# Patient Record
Sex: MALE | Race: WHITE | NOT HISPANIC OR LATINO | Employment: UNEMPLOYED | URBAN - METROPOLITAN AREA
[De-identification: names, ages, dates, MRNs, and addresses within clinical notes are randomized per-mention and may not be internally consistent; named-entity substitution may affect disease eponyms.]

---

## 2019-10-27 ENCOUNTER — HOSPITAL ENCOUNTER (EMERGENCY)
Facility: OTHER | Age: 29
Discharge: HOME | End: 2019-10-27
Attending: EMERGENCY MEDICINE

## 2019-10-27 VITALS
WEIGHT: 180 LBS | HEART RATE: 116 BPM | HEIGHT: 72 IN | BODY MASS INDEX: 24.38 KG/M2 | TEMPERATURE: 98 F | SYSTOLIC BLOOD PRESSURE: 125 MMHG | DIASTOLIC BLOOD PRESSURE: 99 MMHG | RESPIRATION RATE: 17 BRPM | OXYGEN SATURATION: 98 %

## 2019-10-27 DIAGNOSIS — S02.2XXA CLOSED FRACTURE OF NASAL BONE, INITIAL ENCOUNTER: ICD-10-CM

## 2019-10-27 DIAGNOSIS — R04.0 EPISTAXIS: ICD-10-CM
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
## 2019-10-27 PROCEDURE — 25000003 PHARM REV CODE 250: Performed by: EMERGENCY MEDICINE

## 2019-10-27 PROCEDURE — 99283 EMERGENCY DEPT VISIT LOW MDM: CPT

## 2019-10-27 RX ORDER — HYDROCODONE BITARTRATE AND ACETAMINOPHEN 5; 325 MG/1; MG/1
1 TABLET ORAL EVERY 6 HOURS PRN
Qty: 12 TABLET | Refills: 0 | Status: SHIPPED | OUTPATIENT
Start: 2019-10-27 | End: 2019-10-30

## 2019-10-27 RX ORDER — OXYCODONE AND ACETAMINOPHEN 7.5; 325 MG/1; MG/1
1 TABLET ORAL EVERY 4 HOURS PRN
Status: DISCONTINUED | OUTPATIENT
Start: 2019-10-27 | End: 2019-10-27 | Stop reason: HOSPADM

## 2019-10-27 RX ADMIN — OXYCODONE HYDROCHLORIDE AND ACETAMINOPHEN 1 TABLET: 7.5; 325 TABLET ORAL at 05:10
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
## 2019-10-27 NOTE — ED NOTES
Pt cleaned of dried blood to hands. Pt requesting 30mg of percocet. Scant clotted blood noted from nose.
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
## 2021-07-31 ENCOUNTER — EMERGENCY (EMERGENCY)
Facility: HOSPITAL | Age: 31
LOS: 0 days | Discharge: HOME | End: 2021-07-31
Attending: EMERGENCY MEDICINE | Admitting: EMERGENCY MEDICINE
Payer: COMMERCIAL

## 2021-07-31 VITALS
OXYGEN SATURATION: 99 % | RESPIRATION RATE: 18 BRPM | DIASTOLIC BLOOD PRESSURE: 93 MMHG | SYSTOLIC BLOOD PRESSURE: 147 MMHG | TEMPERATURE: 99 F | WEIGHT: 179.9 LBS | HEART RATE: 95 BPM

## 2021-07-31 DIAGNOSIS — R42 DIZZINESS AND GIDDINESS: ICD-10-CM

## 2021-07-31 DIAGNOSIS — R00.2 PALPITATIONS: ICD-10-CM

## 2021-07-31 DIAGNOSIS — R41.9 UNSPECIFIED SYMPTOMS AND SIGNS INVOLVING COGNITIVE FUNCTIONS AND AWARENESS: ICD-10-CM

## 2021-07-31 DIAGNOSIS — K21.9 GASTRO-ESOPHAGEAL REFLUX DISEASE WITHOUT ESOPHAGITIS: ICD-10-CM

## 2021-07-31 DIAGNOSIS — F41.9 ANXIETY DISORDER, UNSPECIFIED: ICD-10-CM

## 2021-07-31 LAB
ALBUMIN SERPL ELPH-MCNC: 5.1 G/DL — SIGNIFICANT CHANGE UP (ref 3.5–5.2)
ALP SERPL-CCNC: 69 U/L — SIGNIFICANT CHANGE UP (ref 30–115)
ALT FLD-CCNC: 45 U/L — HIGH (ref 0–41)
ANION GAP SERPL CALC-SCNC: 14 MMOL/L — SIGNIFICANT CHANGE UP (ref 7–14)
AST SERPL-CCNC: 32 U/L — SIGNIFICANT CHANGE UP (ref 0–41)
BASOPHILS # BLD AUTO: 0.08 K/UL — SIGNIFICANT CHANGE UP (ref 0–0.2)
BASOPHILS NFR BLD AUTO: 0.8 % — SIGNIFICANT CHANGE UP (ref 0–1)
BILIRUB SERPL-MCNC: 0.4 MG/DL — SIGNIFICANT CHANGE UP (ref 0.2–1.2)
BUN SERPL-MCNC: 11 MG/DL — SIGNIFICANT CHANGE UP (ref 10–20)
CALCIUM SERPL-MCNC: 10 MG/DL — SIGNIFICANT CHANGE UP (ref 8.5–10.1)
CHLORIDE SERPL-SCNC: 102 MMOL/L — SIGNIFICANT CHANGE UP (ref 98–110)
CO2 SERPL-SCNC: 21 MMOL/L — SIGNIFICANT CHANGE UP (ref 17–32)
CREAT SERPL-MCNC: 0.8 MG/DL — SIGNIFICANT CHANGE UP (ref 0.7–1.5)
EOSINOPHIL # BLD AUTO: 0.12 K/UL — SIGNIFICANT CHANGE UP (ref 0–0.7)
EOSINOPHIL NFR BLD AUTO: 1.3 % — SIGNIFICANT CHANGE UP (ref 0–8)
GLUCOSE SERPL-MCNC: 82 MG/DL — SIGNIFICANT CHANGE UP (ref 70–99)
HCT VFR BLD CALC: 45.4 % — SIGNIFICANT CHANGE UP (ref 42–52)
HGB BLD-MCNC: 15.8 G/DL — SIGNIFICANT CHANGE UP (ref 14–18)
IMM GRANULOCYTES NFR BLD AUTO: 0.3 % — SIGNIFICANT CHANGE UP (ref 0.1–0.3)
LYMPHOCYTES # BLD AUTO: 2.4 K/UL — SIGNIFICANT CHANGE UP (ref 1.2–3.4)
LYMPHOCYTES # BLD AUTO: 25.1 % — SIGNIFICANT CHANGE UP (ref 20.5–51.1)
MAGNESIUM SERPL-MCNC: 1.9 MG/DL — SIGNIFICANT CHANGE UP (ref 1.8–2.4)
MCHC RBC-ENTMCNC: 30.9 PG — SIGNIFICANT CHANGE UP (ref 27–31)
MCHC RBC-ENTMCNC: 34.8 G/DL — SIGNIFICANT CHANGE UP (ref 32–37)
MCV RBC AUTO: 88.7 FL — SIGNIFICANT CHANGE UP (ref 80–94)
MONOCYTES # BLD AUTO: 0.93 K/UL — HIGH (ref 0.1–0.6)
MONOCYTES NFR BLD AUTO: 9.7 % — HIGH (ref 1.7–9.3)
NEUTROPHILS # BLD AUTO: 6 K/UL — SIGNIFICANT CHANGE UP (ref 1.4–6.5)
NEUTROPHILS NFR BLD AUTO: 62.8 % — SIGNIFICANT CHANGE UP (ref 42.2–75.2)
NRBC # BLD: 0 /100 WBCS — SIGNIFICANT CHANGE UP (ref 0–0)
PLATELET # BLD AUTO: 224 K/UL — SIGNIFICANT CHANGE UP (ref 130–400)
POTASSIUM SERPL-MCNC: 3.9 MMOL/L — SIGNIFICANT CHANGE UP (ref 3.5–5)
POTASSIUM SERPL-SCNC: 3.9 MMOL/L — SIGNIFICANT CHANGE UP (ref 3.5–5)
PROT SERPL-MCNC: 8.1 G/DL — HIGH (ref 6–8)
RBC # BLD: 5.12 M/UL — SIGNIFICANT CHANGE UP (ref 4.7–6.1)
RBC # FLD: 12 % — SIGNIFICANT CHANGE UP (ref 11.5–14.5)
SODIUM SERPL-SCNC: 137 MMOL/L — SIGNIFICANT CHANGE UP (ref 135–146)
WBC # BLD: 9.56 K/UL — SIGNIFICANT CHANGE UP (ref 4.8–10.8)
WBC # FLD AUTO: 9.56 K/UL — SIGNIFICANT CHANGE UP (ref 4.8–10.8)

## 2021-07-31 PROCEDURE — 99284 EMERGENCY DEPT VISIT MOD MDM: CPT

## 2021-07-31 PROCEDURE — 93010 ELECTROCARDIOGRAM REPORT: CPT

## 2021-07-31 RX ORDER — FAMOTIDINE 10 MG/ML
20 INJECTION INTRAVENOUS ONCE
Refills: 0 | Status: COMPLETED | OUTPATIENT
Start: 2021-07-31 | End: 2021-07-31

## 2021-07-31 RX ORDER — SODIUM CHLORIDE 9 MG/ML
1000 INJECTION INTRAMUSCULAR; INTRAVENOUS; SUBCUTANEOUS ONCE
Refills: 0 | Status: COMPLETED | OUTPATIENT
Start: 2021-07-31 | End: 2021-07-31

## 2021-07-31 RX ADMIN — FAMOTIDINE 100 MILLIGRAM(S): 10 INJECTION INTRAVENOUS at 21:05

## 2021-07-31 RX ADMIN — SODIUM CHLORIDE 1000 MILLILITER(S): 9 INJECTION INTRAMUSCULAR; INTRAVENOUS; SUBCUTANEOUS at 21:05

## 2021-07-31 NOTE — ED PROVIDER NOTE - CLINICAL SUMMARY MEDICAL DECISION MAKING FREE TEXT BOX
Labs unremarkable.  EKG NSR no acute changes.  Given IVF and Pepcid with improvement.  Will D/C to follow up with PCP.

## 2021-07-31 NOTE — ED PROVIDER NOTE - OBJECTIVE STATEMENT
30 yo male hx of anxiety and GERD present c/o "feeling anxious and fluttering to his chest and LUQ and lightheaded" all day after drinking heavily a night ago. denies nausea and vomiting. continue with fluttering sensation to his chest to he came to ED for evaluation. Denies drug use last night.  Denies fever/chill/HA/dizziness/sob/abd pain/n/v/d/ black stool/bloody stool/urinary sxs
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
## 2021-07-31 NOTE — ED ADULT NURSE NOTE - NSFALLRSKOUTCOME_ED_ALL_ED
Addended by: MARISOL CARABALLO on: 5/12/2017 11:58 AM     Modules accepted: Lillian Epstein    
Universal Safety Interventions

## 2021-07-31 NOTE — ED PROVIDER NOTE - PATIENT PORTAL LINK FT
You can access the FollowMyHealth Patient Portal offered by [facility] by registering at the following website: [email or web address]. By joining [facility]’s FollowMyHealth portal, you will also be able to view your health information using other applications (apps) compatible with our system.

## 2021-08-29 ENCOUNTER — EMERGENCY (EMERGENCY)
Facility: HOSPITAL | Age: 31
LOS: 0 days | Discharge: HOME | End: 2021-08-29
Attending: EMERGENCY MEDICINE | Admitting: EMERGENCY MEDICINE
Payer: COMMERCIAL

## 2021-08-29 VITALS
TEMPERATURE: 98 F | WEIGHT: 195.11 LBS | SYSTOLIC BLOOD PRESSURE: 133 MMHG | HEART RATE: 110 BPM | OXYGEN SATURATION: 97 % | DIASTOLIC BLOOD PRESSURE: 91 MMHG | RESPIRATION RATE: 28 BRPM | HEIGHT: 73 IN

## 2021-08-29 VITALS — SYSTOLIC BLOOD PRESSURE: 122 MMHG | DIASTOLIC BLOOD PRESSURE: 87 MMHG | HEART RATE: 94 BPM

## 2021-08-29 DIAGNOSIS — F41.9 ANXIETY DISORDER, UNSPECIFIED: ICD-10-CM

## 2021-08-29 DIAGNOSIS — R07.89 OTHER CHEST PAIN: ICD-10-CM

## 2021-08-29 DIAGNOSIS — R06.02 SHORTNESS OF BREATH: ICD-10-CM

## 2021-08-29 DIAGNOSIS — K21.9 GASTRO-ESOPHAGEAL REFLUX DISEASE WITHOUT ESOPHAGITIS: ICD-10-CM

## 2021-08-29 DIAGNOSIS — F41.0 PANIC DISORDER [EPISODIC PAROXYSMAL ANXIETY]: ICD-10-CM

## 2021-08-29 PROBLEM — Z78.9 OTHER SPECIFIED HEALTH STATUS: Chronic | Status: ACTIVE | Noted: 2021-07-31

## 2021-08-29 LAB
ALBUMIN SERPL ELPH-MCNC: 4.5 G/DL — SIGNIFICANT CHANGE UP (ref 3.5–5.2)
ALP SERPL-CCNC: 59 U/L — SIGNIFICANT CHANGE UP (ref 30–115)
ALT FLD-CCNC: 38 U/L — SIGNIFICANT CHANGE UP (ref 0–41)
ANION GAP SERPL CALC-SCNC: 11 MMOL/L — SIGNIFICANT CHANGE UP (ref 7–14)
AST SERPL-CCNC: 25 U/L — SIGNIFICANT CHANGE UP (ref 0–41)
BASOPHILS # BLD AUTO: 0.04 K/UL — SIGNIFICANT CHANGE UP (ref 0–0.2)
BASOPHILS NFR BLD AUTO: 0.5 % — SIGNIFICANT CHANGE UP (ref 0–1)
BILIRUB DIRECT SERPL-MCNC: <0.2 MG/DL — SIGNIFICANT CHANGE UP (ref 0–0.2)
BILIRUB INDIRECT FLD-MCNC: >0.2 MG/DL — SIGNIFICANT CHANGE UP (ref 0.2–1.2)
BILIRUB SERPL-MCNC: 0.4 MG/DL — SIGNIFICANT CHANGE UP (ref 0.2–1.2)
BUN SERPL-MCNC: 13 MG/DL — SIGNIFICANT CHANGE UP (ref 10–20)
CALCIUM SERPL-MCNC: 9.4 MG/DL — SIGNIFICANT CHANGE UP (ref 8.5–10.1)
CHLORIDE SERPL-SCNC: 103 MMOL/L — SIGNIFICANT CHANGE UP (ref 98–110)
CO2 SERPL-SCNC: 24 MMOL/L — SIGNIFICANT CHANGE UP (ref 17–32)
CREAT SERPL-MCNC: 0.8 MG/DL — SIGNIFICANT CHANGE UP (ref 0.7–1.5)
D DIMER BLD IA.RAPID-MCNC: 51 NG/ML DDU — SIGNIFICANT CHANGE UP (ref 0–230)
EOSINOPHIL # BLD AUTO: 0.22 K/UL — SIGNIFICANT CHANGE UP (ref 0–0.7)
EOSINOPHIL NFR BLD AUTO: 2.5 % — SIGNIFICANT CHANGE UP (ref 0–8)
GLUCOSE SERPL-MCNC: 91 MG/DL — SIGNIFICANT CHANGE UP (ref 70–99)
HCT VFR BLD CALC: 41 % — LOW (ref 42–52)
HGB BLD-MCNC: 14.1 G/DL — SIGNIFICANT CHANGE UP (ref 14–18)
IMM GRANULOCYTES NFR BLD AUTO: 0.3 % — SIGNIFICANT CHANGE UP (ref 0.1–0.3)
LIDOCAIN IGE QN: 18 U/L — SIGNIFICANT CHANGE UP (ref 7–60)
LYMPHOCYTES # BLD AUTO: 2.14 K/UL — SIGNIFICANT CHANGE UP (ref 1.2–3.4)
LYMPHOCYTES # BLD AUTO: 24.6 % — SIGNIFICANT CHANGE UP (ref 20.5–51.1)
MCHC RBC-ENTMCNC: 31.1 PG — HIGH (ref 27–31)
MCHC RBC-ENTMCNC: 34.4 G/DL — SIGNIFICANT CHANGE UP (ref 32–37)
MCV RBC AUTO: 90.3 FL — SIGNIFICANT CHANGE UP (ref 80–94)
MONOCYTES # BLD AUTO: 0.97 K/UL — HIGH (ref 0.1–0.6)
MONOCYTES NFR BLD AUTO: 11.1 % — HIGH (ref 1.7–9.3)
NEUTROPHILS # BLD AUTO: 5.3 K/UL — SIGNIFICANT CHANGE UP (ref 1.4–6.5)
NEUTROPHILS NFR BLD AUTO: 61 % — SIGNIFICANT CHANGE UP (ref 42.2–75.2)
NRBC # BLD: 0 /100 WBCS — SIGNIFICANT CHANGE UP (ref 0–0)
PLATELET # BLD AUTO: 194 K/UL — SIGNIFICANT CHANGE UP (ref 130–400)
POTASSIUM SERPL-MCNC: 3.6 MMOL/L — SIGNIFICANT CHANGE UP (ref 3.5–5)
POTASSIUM SERPL-SCNC: 3.6 MMOL/L — SIGNIFICANT CHANGE UP (ref 3.5–5)
PROT SERPL-MCNC: 6.8 G/DL — SIGNIFICANT CHANGE UP (ref 6–8)
RBC # BLD: 4.54 M/UL — LOW (ref 4.7–6.1)
RBC # FLD: 12 % — SIGNIFICANT CHANGE UP (ref 11.5–14.5)
SODIUM SERPL-SCNC: 138 MMOL/L — SIGNIFICANT CHANGE UP (ref 135–146)
TROPONIN T SERPL-MCNC: <0.01 NG/ML — SIGNIFICANT CHANGE UP
WBC # BLD: 8.7 K/UL — SIGNIFICANT CHANGE UP (ref 4.8–10.8)
WBC # FLD AUTO: 8.7 K/UL — SIGNIFICANT CHANGE UP (ref 4.8–10.8)

## 2021-08-29 PROCEDURE — 99285 EMERGENCY DEPT VISIT HI MDM: CPT

## 2021-08-29 PROCEDURE — 93010 ELECTROCARDIOGRAM REPORT: CPT

## 2021-08-29 PROCEDURE — 71046 X-RAY EXAM CHEST 2 VIEWS: CPT | Mod: 26

## 2021-08-29 RX ORDER — FAMOTIDINE 10 MG/ML
20 INJECTION INTRAVENOUS ONCE
Refills: 0 | Status: COMPLETED | OUTPATIENT
Start: 2021-08-29 | End: 2021-08-29

## 2021-08-29 RX ORDER — SODIUM CHLORIDE 9 MG/ML
1000 INJECTION INTRAMUSCULAR; INTRAVENOUS; SUBCUTANEOUS ONCE
Refills: 0 | Status: COMPLETED | OUTPATIENT
Start: 2021-08-29 | End: 2021-08-29

## 2021-08-29 RX ADMIN — FAMOTIDINE 104 MILLIGRAM(S): 10 INJECTION INTRAVENOUS at 02:52

## 2021-08-29 RX ADMIN — Medication 30 MILLILITER(S): at 02:54

## 2021-08-29 RX ADMIN — SODIUM CHLORIDE 1000 MILLILITER(S): 9 INJECTION INTRAMUSCULAR; INTRAVENOUS; SUBCUTANEOUS at 02:52

## 2021-08-29 NOTE — ED ADULT NURSE NOTE - OBJECTIVE STATEMENT
patient c/o panic attack. states he was drinking and smoking weed yesterday, experienced acid reflux today and then had a panic attack. denies cp/sob/n/v/d

## 2021-08-29 NOTE — ED PROVIDER NOTE - OBJECTIVE STATEMENT
Patient states that, suddenly started having palpitations, described as heart beating fast, associated with sob and chest tightness, symptoms continued, started worrying about his heart, which lead to recurrence of his anxiety and followed by panic attack. Patient states that, has h/o anxiety and panic attacks, whenever he worries about things gets anxious, and sometimes that leads to panic attack. Patient denies feeling depressed, denies OD on medications, denies SI/HI, denies trauma. Patient also states that, has h/o GERD and takes pepcid, with his anxiety, started feeling nauseous and that led to the acid reflex as well. Denies  symptoms, no rash. Denies HA/neck pain/back pain.

## 2021-08-29 NOTE — ED PROVIDER NOTE - NSFOLLOWUPCLINICS_GEN_ALL_ED_FT
[facility]  Medicine  [address], [place]   Phone: [phone]  Fax:   Follow Up Time: Urgent
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
## 2021-08-29 NOTE — ED PROVIDER NOTE - PATIENT PORTAL LINK FT
You can access the FollowMyHealth Patient Portal offered by [facility] by registering at the following website: [email or web address]. By joining [facility]’s FollowMyHealth portal, you will also be able to view your health information using other applications (apps) compatible with our system.

## 2021-11-22 ENCOUNTER — EMERGENCY (EMERGENCY)
Facility: HOSPITAL | Age: 31
LOS: 0 days | Discharge: HOME | End: 2021-11-22
Attending: EMERGENCY MEDICINE | Admitting: EMERGENCY MEDICINE
Payer: COMMERCIAL

## 2021-11-22 VITALS
WEIGHT: 195.11 LBS | DIASTOLIC BLOOD PRESSURE: 72 MMHG | HEIGHT: 73 IN | SYSTOLIC BLOOD PRESSURE: 120 MMHG | HEART RATE: 115 BPM | RESPIRATION RATE: 18 BRPM

## 2021-11-22 VITALS
TEMPERATURE: 97 F | SYSTOLIC BLOOD PRESSURE: 111 MMHG | RESPIRATION RATE: 16 BRPM | OXYGEN SATURATION: 98 % | HEART RATE: 108 BPM | DIASTOLIC BLOOD PRESSURE: 56 MMHG

## 2021-11-22 DIAGNOSIS — R19.7 DIARRHEA, UNSPECIFIED: ICD-10-CM

## 2021-11-22 DIAGNOSIS — Z20.822 CONTACT WITH AND (SUSPECTED) EXPOSURE TO COVID-19: ICD-10-CM

## 2021-11-22 DIAGNOSIS — R68.83 CHILLS (WITHOUT FEVER): ICD-10-CM

## 2021-11-22 DIAGNOSIS — K21.9 GASTRO-ESOPHAGEAL REFLUX DISEASE WITHOUT ESOPHAGITIS: ICD-10-CM

## 2021-11-22 DIAGNOSIS — R11.2 NAUSEA WITH VOMITING, UNSPECIFIED: ICD-10-CM

## 2021-11-22 DIAGNOSIS — F17.200 NICOTINE DEPENDENCE, UNSPECIFIED, UNCOMPLICATED: ICD-10-CM

## 2021-11-22 LAB
ALBUMIN SERPL ELPH-MCNC: 5 G/DL — SIGNIFICANT CHANGE UP (ref 3.5–5.2)
ALP SERPL-CCNC: 67 U/L — SIGNIFICANT CHANGE UP (ref 30–115)
ALT FLD-CCNC: 33 U/L — SIGNIFICANT CHANGE UP (ref 0–41)
ANION GAP SERPL CALC-SCNC: 21 MMOL/L — HIGH (ref 7–14)
APPEARANCE UR: CLEAR — SIGNIFICANT CHANGE UP
AST SERPL-CCNC: 25 U/L — SIGNIFICANT CHANGE UP (ref 0–41)
BACTERIA # UR AUTO: NEGATIVE — SIGNIFICANT CHANGE UP
BASOPHILS # BLD AUTO: 0.05 K/UL — SIGNIFICANT CHANGE UP (ref 0–0.2)
BASOPHILS NFR BLD AUTO: 0.5 % — SIGNIFICANT CHANGE UP (ref 0–1)
BILIRUB DIRECT SERPL-MCNC: 0.3 MG/DL — SIGNIFICANT CHANGE UP (ref 0–0.3)
BILIRUB INDIRECT FLD-MCNC: 1.1 MG/DL — SIGNIFICANT CHANGE UP (ref 0.2–1.2)
BILIRUB SERPL-MCNC: 1.4 MG/DL — HIGH (ref 0.2–1.2)
BILIRUB UR-MCNC: NEGATIVE — SIGNIFICANT CHANGE UP
BUN SERPL-MCNC: 16 MG/DL — SIGNIFICANT CHANGE UP (ref 10–20)
CALCIUM SERPL-MCNC: 10 MG/DL — SIGNIFICANT CHANGE UP (ref 8.5–10.1)
CHLORIDE SERPL-SCNC: 100 MMOL/L — SIGNIFICANT CHANGE UP (ref 98–110)
CO2 SERPL-SCNC: 18 MMOL/L — SIGNIFICANT CHANGE UP (ref 17–32)
COLOR SPEC: YELLOW — SIGNIFICANT CHANGE UP
CREAT SERPL-MCNC: 1.1 MG/DL — SIGNIFICANT CHANGE UP (ref 0.7–1.5)
DIFF PNL FLD: NEGATIVE — SIGNIFICANT CHANGE UP
EOSINOPHIL # BLD AUTO: 0.01 K/UL — SIGNIFICANT CHANGE UP (ref 0–0.7)
EOSINOPHIL NFR BLD AUTO: 0.1 % — SIGNIFICANT CHANGE UP (ref 0–8)
EPI CELLS # UR: 1 /HPF — SIGNIFICANT CHANGE UP (ref 0–5)
GLUCOSE SERPL-MCNC: 163 MG/DL — HIGH (ref 70–99)
GLUCOSE UR QL: NEGATIVE — SIGNIFICANT CHANGE UP
HCT VFR BLD CALC: 52.2 % — HIGH (ref 42–52)
HGB BLD-MCNC: 18.6 G/DL — HIGH (ref 14–18)
HYALINE CASTS # UR AUTO: 2 /LPF — SIGNIFICANT CHANGE UP (ref 0–7)
IMM GRANULOCYTES NFR BLD AUTO: 0.2 % — SIGNIFICANT CHANGE UP (ref 0.1–0.3)
KETONES UR-MCNC: SIGNIFICANT CHANGE UP
LEUKOCYTE ESTERASE UR-ACNC: NEGATIVE — SIGNIFICANT CHANGE UP
LIDOCAIN IGE QN: 14 U/L — SIGNIFICANT CHANGE UP (ref 7–60)
LYMPHOCYTES # BLD AUTO: 0.3 K/UL — LOW (ref 1.2–3.4)
LYMPHOCYTES # BLD AUTO: 2.9 % — LOW (ref 20.5–51.1)
MCHC RBC-ENTMCNC: 31.2 PG — HIGH (ref 27–31)
MCHC RBC-ENTMCNC: 35.6 G/DL — SIGNIFICANT CHANGE UP (ref 32–37)
MCV RBC AUTO: 87.6 FL — SIGNIFICANT CHANGE UP (ref 80–94)
MONOCYTES # BLD AUTO: 0.86 K/UL — HIGH (ref 0.1–0.6)
MONOCYTES NFR BLD AUTO: 8.3 % — SIGNIFICANT CHANGE UP (ref 1.7–9.3)
NEUTROPHILS # BLD AUTO: 9.18 K/UL — HIGH (ref 1.4–6.5)
NEUTROPHILS NFR BLD AUTO: 88 % — HIGH (ref 42.2–75.2)
NITRITE UR-MCNC: NEGATIVE — SIGNIFICANT CHANGE UP
NRBC # BLD: 0 /100 WBCS — SIGNIFICANT CHANGE UP (ref 0–0)
PH UR: 6.5 — SIGNIFICANT CHANGE UP (ref 5–8)
PLATELET # BLD AUTO: 216 K/UL — SIGNIFICANT CHANGE UP (ref 130–400)
POTASSIUM SERPL-MCNC: 4.1 MMOL/L — SIGNIFICANT CHANGE UP (ref 3.5–5)
POTASSIUM SERPL-SCNC: 4.1 MMOL/L — SIGNIFICANT CHANGE UP (ref 3.5–5)
PROT SERPL-MCNC: 7.9 G/DL — SIGNIFICANT CHANGE UP (ref 6–8)
PROT UR-MCNC: ABNORMAL
RAPID RVP RESULT: SIGNIFICANT CHANGE UP
RBC # BLD: 5.96 M/UL — SIGNIFICANT CHANGE UP (ref 4.7–6.1)
RBC # FLD: 11.8 % — SIGNIFICANT CHANGE UP (ref 11.5–14.5)
RBC CASTS # UR COMP ASSIST: 1 /HPF — SIGNIFICANT CHANGE UP (ref 0–4)
SARS-COV-2 RNA SPEC QL NAA+PROBE: SIGNIFICANT CHANGE UP
SODIUM SERPL-SCNC: 139 MMOL/L — SIGNIFICANT CHANGE UP (ref 135–146)
SP GR SPEC: 1.03 — SIGNIFICANT CHANGE UP (ref 1.01–1.03)
UROBILINOGEN FLD QL: SIGNIFICANT CHANGE UP
WBC # BLD: 10.42 K/UL — SIGNIFICANT CHANGE UP (ref 4.8–10.8)
WBC # FLD AUTO: 10.42 K/UL — SIGNIFICANT CHANGE UP (ref 4.8–10.8)
WBC UR QL: 2 /HPF — SIGNIFICANT CHANGE UP (ref 0–5)

## 2021-11-22 PROCEDURE — 99284 EMERGENCY DEPT VISIT MOD MDM: CPT

## 2021-11-22 PROCEDURE — 71045 X-RAY EXAM CHEST 1 VIEW: CPT | Mod: 26

## 2021-11-22 RX ORDER — ONDANSETRON 8 MG/1
1 TABLET, FILM COATED ORAL
Qty: 15 | Refills: 0
Start: 2021-11-22 | End: 2021-11-26

## 2021-11-22 RX ORDER — SODIUM CHLORIDE 9 MG/ML
1000 INJECTION INTRAMUSCULAR; INTRAVENOUS; SUBCUTANEOUS ONCE
Refills: 0 | Status: COMPLETED | OUTPATIENT
Start: 2021-11-22 | End: 2021-11-22

## 2021-11-22 RX ORDER — ONDANSETRON 8 MG/1
4 TABLET, FILM COATED ORAL ONCE
Refills: 0 | Status: COMPLETED | OUTPATIENT
Start: 2021-11-22 | End: 2021-11-22

## 2021-11-22 RX ORDER — MORPHINE SULFATE 50 MG/1
2 CAPSULE, EXTENDED RELEASE ORAL ONCE
Refills: 0 | Status: DISCONTINUED | OUTPATIENT
Start: 2021-11-22 | End: 2021-11-22

## 2021-11-22 RX ORDER — FAMOTIDINE 10 MG/ML
20 INJECTION INTRAVENOUS ONCE
Refills: 0 | Status: COMPLETED | OUTPATIENT
Start: 2021-11-22 | End: 2021-11-22

## 2021-11-22 RX ADMIN — SODIUM CHLORIDE 1000 MILLILITER(S): 9 INJECTION INTRAMUSCULAR; INTRAVENOUS; SUBCUTANEOUS at 09:15

## 2021-11-22 RX ADMIN — FAMOTIDINE 20 MILLIGRAM(S): 10 INJECTION INTRAVENOUS at 09:15

## 2021-11-22 RX ADMIN — SODIUM CHLORIDE 1000 MILLILITER(S): 9 INJECTION INTRAMUSCULAR; INTRAVENOUS; SUBCUTANEOUS at 10:29

## 2021-11-22 RX ADMIN — ONDANSETRON 4 MILLIGRAM(S): 8 TABLET, FILM COATED ORAL at 09:15

## 2021-11-22 RX ADMIN — MORPHINE SULFATE 2 MILLIGRAM(S): 50 CAPSULE, EXTENDED RELEASE ORAL at 10:29

## 2021-11-22 RX ADMIN — MORPHINE SULFATE 2 MILLIGRAM(S): 50 CAPSULE, EXTENDED RELEASE ORAL at 10:03

## 2021-11-22 RX ADMIN — SODIUM CHLORIDE 1000 MILLILITER(S): 9 INJECTION INTRAMUSCULAR; INTRAVENOUS; SUBCUTANEOUS at 11:03

## 2021-11-22 NOTE — ED PROVIDER NOTE - OBJECTIVE STATEMENT
30 y/o male with hx anxiety, acid reflux presents to the ED c/o I woke up with severe nausea, vomiting and diarrhea and chills. My girlfriend has the same symptoms." no abd pain/ fever/ urinary symptoms

## 2021-11-22 NOTE — ED ADULT NURSE NOTE - OBJECTIVE STATEMENT
pt presents to the ED with n/v, abd pain since last night. pt states he was drinking ETOH, and endorses daily MJ use. pt denies diarrhea, sob, cp. safety measures in place

## 2021-11-22 NOTE — ED PROVIDER NOTE - PATIENT PORTAL LINK FT
You can access the FollowMyHealth Patient Portal offered by [facility] by registering at the following website: [email or web address]. By joining [facility]’s FollowMyHealth portal, you will also be able to view your health information using other applications (apps) compatible with our system.

## 2021-11-22 NOTE — ED ADULT NURSE NOTE - NSIMPLEMENTINTERV_GEN_ALL_ED
Implemented All Universal Safety Interventions:  [place] to call system. Call bell, personal items and telephone within reach. Instruct patient to call for assistance. Room bathroom lighting operational. Non-slip footwear when patient is off stretcher. Physically safe environment: no spills, clutter or unnecessary equipment. Stretcher in lowest position, wheels locked, appropriate side rails in place.

## 2021-11-22 NOTE — ED ADULT TRIAGE NOTE - CHIEF COMPLAINT QUOTE
Patient c/o N/V/D since last night, multiple episode of vomiting, as well as diarrhea. Patients girl friend is also here with similar symptoms

## 2021-11-22 NOTE — ED PROVIDER NOTE - CLINICAL SUMMARY MEDICAL DECISION MAKING FREE TEXT BOX
Feels better after meds.  Labs reassuring.  Abdomen soft and non tender.  Tolerates PO.  VSS.  DC home.  Strict return instructions discussed.

## 2022-10-20 NOTE — ED ADULT NURSE NOTE - TEMPLATE
Hypertension is stable.  Continue current treatment regimen.  Blood pressure will be reassessed in 3 months.   General

## 2023-02-13 NOTE — ED ADULT NURSE NOTE - NS ED PATIENT SAFETY CONCERN
Patient reports he woke up this morning around 0600 and was unable to stand he went to work and at [employer] he noticed that his speech was slurred. /90     Patient on warfarin and dialysis MWF.
No

## 2023-03-24 ENCOUNTER — EMERGENCY (EMERGENCY)
Facility: HOSPITAL | Age: 33
LOS: 0 days | Discharge: LEFT BEFORE DISCHARGE | End: 2023-03-25
Attending: EMERGENCY MEDICINE
Payer: COMMERCIAL

## 2023-03-24 VITALS
OXYGEN SATURATION: 98 % | SYSTOLIC BLOOD PRESSURE: 111 MMHG | RESPIRATION RATE: 20 BRPM | TEMPERATURE: 99 F | DIASTOLIC BLOOD PRESSURE: 62 MMHG | HEART RATE: 125 BPM

## 2023-03-24 DIAGNOSIS — R53.81 OTHER MALAISE: ICD-10-CM

## 2023-03-24 DIAGNOSIS — R53.1 WEAKNESS: ICD-10-CM

## 2023-03-24 DIAGNOSIS — R11.0 NAUSEA: ICD-10-CM

## 2023-03-24 DIAGNOSIS — R50.9 FEVER, UNSPECIFIED: ICD-10-CM

## 2023-03-24 DIAGNOSIS — Z87.891 PERSONAL HISTORY OF NICOTINE DEPENDENCE: ICD-10-CM

## 2023-03-24 DIAGNOSIS — M79.10 MYALGIA, UNSPECIFIED SITE: ICD-10-CM

## 2023-03-24 DIAGNOSIS — Z53.29 PROCEDURE AND TREATMENT NOT CARRIED OUT BECAUSE OF PATIENT'S DECISION FOR OTHER REASONS: ICD-10-CM

## 2023-03-24 DIAGNOSIS — Z20.822 CONTACT WITH AND (SUSPECTED) EXPOSURE TO COVID-19: ICD-10-CM

## 2023-03-24 LAB
ALBUMIN SERPL ELPH-MCNC: 4.2 G/DL — SIGNIFICANT CHANGE UP (ref 3.5–5.2)
ALP SERPL-CCNC: 73 U/L — SIGNIFICANT CHANGE UP (ref 30–115)
ALT FLD-CCNC: 18 U/L — SIGNIFICANT CHANGE UP (ref 0–41)
ANION GAP SERPL CALC-SCNC: 11 MMOL/L — SIGNIFICANT CHANGE UP (ref 7–14)
APTT BLD: 29.8 SEC — SIGNIFICANT CHANGE UP (ref 27–39.2)
AST SERPL-CCNC: 16 U/L — SIGNIFICANT CHANGE UP (ref 0–41)
BASOPHILS # BLD AUTO: 0.04 K/UL — SIGNIFICANT CHANGE UP (ref 0–0.2)
BASOPHILS NFR BLD AUTO: 0.2 % — SIGNIFICANT CHANGE UP (ref 0–1)
BILIRUB SERPL-MCNC: 0.4 MG/DL — SIGNIFICANT CHANGE UP (ref 0.2–1.2)
BUN SERPL-MCNC: 5 MG/DL — LOW (ref 10–20)
CALCIUM SERPL-MCNC: 9.3 MG/DL — SIGNIFICANT CHANGE UP (ref 8.4–10.5)
CHLORIDE SERPL-SCNC: 101 MMOL/L — SIGNIFICANT CHANGE UP (ref 98–110)
CO2 SERPL-SCNC: 25 MMOL/L — SIGNIFICANT CHANGE UP (ref 17–32)
CREAT SERPL-MCNC: 0.6 MG/DL — LOW (ref 0.7–1.5)
EGFR: 133 ML/MIN/1.73M2 — SIGNIFICANT CHANGE UP
EOSINOPHIL # BLD AUTO: 0.01 K/UL — SIGNIFICANT CHANGE UP (ref 0–0.7)
EOSINOPHIL NFR BLD AUTO: 0.1 % — SIGNIFICANT CHANGE UP (ref 0–8)
FLUAV AG NPH QL: SIGNIFICANT CHANGE UP
FLUBV AG NPH QL: SIGNIFICANT CHANGE UP
GAS PNL BLDV: SIGNIFICANT CHANGE UP
GLUCOSE SERPL-MCNC: 97 MG/DL — SIGNIFICANT CHANGE UP (ref 70–99)
HCT VFR BLD CALC: 45.1 % — SIGNIFICANT CHANGE UP (ref 42–52)
HGB BLD-MCNC: 15.2 G/DL — SIGNIFICANT CHANGE UP (ref 14–18)
HIV 1 & 2 AB SERPL IA.RAPID: SIGNIFICANT CHANGE UP
IMM GRANULOCYTES NFR BLD AUTO: 0.8 % — HIGH (ref 0.1–0.3)
INR BLD: 1.3 RATIO — SIGNIFICANT CHANGE UP (ref 0.65–1.3)
LIDOCAIN IGE QN: 25 U/L — SIGNIFICANT CHANGE UP (ref 7–60)
LYMPHOCYTES # BLD AUTO: 0.87 K/UL — LOW (ref 1.2–3.4)
LYMPHOCYTES # BLD AUTO: 4.5 % — LOW (ref 20.5–51.1)
MAGNESIUM SERPL-MCNC: 1.7 MG/DL — LOW (ref 1.8–2.4)
MCHC RBC-ENTMCNC: 31.9 PG — HIGH (ref 27–31)
MCHC RBC-ENTMCNC: 33.7 G/DL — SIGNIFICANT CHANGE UP (ref 32–37)
MCV RBC AUTO: 94.5 FL — HIGH (ref 80–94)
MONOCYTES # BLD AUTO: 1.62 K/UL — HIGH (ref 0.1–0.6)
MONOCYTES NFR BLD AUTO: 8.4 % — SIGNIFICANT CHANGE UP (ref 1.7–9.3)
NEUTROPHILS # BLD AUTO: 16.51 K/UL — HIGH (ref 1.4–6.5)
NEUTROPHILS NFR BLD AUTO: 86 % — HIGH (ref 42.2–75.2)
NRBC # BLD: 0 /100 WBCS — SIGNIFICANT CHANGE UP (ref 0–0)
PLATELET # BLD AUTO: 167 K/UL — SIGNIFICANT CHANGE UP (ref 130–400)
POTASSIUM SERPL-MCNC: 4.1 MMOL/L — SIGNIFICANT CHANGE UP (ref 3.5–5)
POTASSIUM SERPL-SCNC: 4.1 MMOL/L — SIGNIFICANT CHANGE UP (ref 3.5–5)
PROT SERPL-MCNC: 6.9 G/DL — SIGNIFICANT CHANGE UP (ref 6–8)
PROTHROM AB SERPL-ACNC: 14.9 SEC — HIGH (ref 9.95–12.87)
RBC # BLD: 4.77 M/UL — SIGNIFICANT CHANGE UP (ref 4.7–6.1)
RBC # FLD: 12 % — SIGNIFICANT CHANGE UP (ref 11.5–14.5)
RSV RNA NPH QL NAA+NON-PROBE: SIGNIFICANT CHANGE UP
SARS-COV-2 RNA SPEC QL NAA+PROBE: SIGNIFICANT CHANGE UP
SODIUM SERPL-SCNC: 137 MMOL/L — SIGNIFICANT CHANGE UP (ref 135–146)
TROPONIN T SERPL-MCNC: <0.01 NG/ML — SIGNIFICANT CHANGE UP
WBC # BLD: 19.21 K/UL — HIGH (ref 4.8–10.8)
WBC # FLD AUTO: 19.21 K/UL — HIGH (ref 4.8–10.8)

## 2023-03-24 PROCEDURE — 0241U: CPT

## 2023-03-24 PROCEDURE — 87077 CULTURE AEROBIC IDENTIFY: CPT

## 2023-03-24 PROCEDURE — 83605 ASSAY OF LACTIC ACID: CPT

## 2023-03-24 PROCEDURE — 87086 URINE CULTURE/COLONY COUNT: CPT

## 2023-03-24 PROCEDURE — 81003 URINALYSIS AUTO W/O SCOPE: CPT

## 2023-03-24 PROCEDURE — 82803 BLOOD GASES ANY COMBINATION: CPT

## 2023-03-24 PROCEDURE — 80053 COMPREHEN METABOLIC PANEL: CPT

## 2023-03-24 PROCEDURE — 71045 X-RAY EXAM CHEST 1 VIEW: CPT | Mod: 26

## 2023-03-24 PROCEDURE — 87040 BLOOD CULTURE FOR BACTERIA: CPT

## 2023-03-24 PROCEDURE — 87340 HEPATITIS B SURFACE AG IA: CPT

## 2023-03-24 PROCEDURE — 93005 ELECTROCARDIOGRAM TRACING: CPT

## 2023-03-24 PROCEDURE — 84132 ASSAY OF SERUM POTASSIUM: CPT

## 2023-03-24 PROCEDURE — 93010 ELECTROCARDIOGRAM REPORT: CPT

## 2023-03-24 PROCEDURE — 82330 ASSAY OF CALCIUM: CPT

## 2023-03-24 PROCEDURE — 85018 HEMOGLOBIN: CPT

## 2023-03-24 PROCEDURE — 84295 ASSAY OF SERUM SODIUM: CPT

## 2023-03-24 PROCEDURE — 85014 HEMATOCRIT: CPT

## 2023-03-24 PROCEDURE — 83735 ASSAY OF MAGNESIUM: CPT

## 2023-03-24 PROCEDURE — 99285 EMERGENCY DEPT VISIT HI MDM: CPT | Mod: 25

## 2023-03-24 PROCEDURE — 86803 HEPATITIS C AB TEST: CPT

## 2023-03-24 PROCEDURE — 85610 PROTHROMBIN TIME: CPT

## 2023-03-24 PROCEDURE — 85730 THROMBOPLASTIN TIME PARTIAL: CPT

## 2023-03-24 PROCEDURE — 85379 FIBRIN DEGRADATION QUANT: CPT

## 2023-03-24 PROCEDURE — 36415 COLL VENOUS BLD VENIPUNCTURE: CPT

## 2023-03-24 PROCEDURE — 80074 ACUTE HEPATITIS PANEL: CPT

## 2023-03-24 PROCEDURE — 99285 EMERGENCY DEPT VISIT HI MDM: CPT

## 2023-03-24 PROCEDURE — 71045 X-RAY EXAM CHEST 1 VIEW: CPT

## 2023-03-24 PROCEDURE — 83690 ASSAY OF LIPASE: CPT

## 2023-03-24 PROCEDURE — 84484 ASSAY OF TROPONIN QUANT: CPT

## 2023-03-24 PROCEDURE — 86703 HIV-1/HIV-2 1 RESULT ANTBDY: CPT

## 2023-03-24 PROCEDURE — 85025 COMPLETE CBC W/AUTO DIFF WBC: CPT

## 2023-03-24 PROCEDURE — 87150 DNA/RNA AMPLIFIED PROBE: CPT

## 2023-03-24 PROCEDURE — 86706 HEP B SURFACE ANTIBODY: CPT

## 2023-03-24 PROCEDURE — 86780 TREPONEMA PALLIDUM: CPT

## 2023-03-24 RX ORDER — SODIUM CHLORIDE 9 MG/ML
1000 INJECTION INTRAMUSCULAR; INTRAVENOUS; SUBCUTANEOUS ONCE
Refills: 0 | Status: DISCONTINUED | OUTPATIENT
Start: 2023-03-24 | End: 2023-03-25

## 2023-03-24 RX ORDER — SODIUM CHLORIDE 9 MG/ML
1000 INJECTION INTRAMUSCULAR; INTRAVENOUS; SUBCUTANEOUS ONCE
Refills: 0 | Status: COMPLETED | OUTPATIENT
Start: 2023-03-24 | End: 2023-03-24

## 2023-03-24 RX ADMIN — SODIUM CHLORIDE 2000 MILLILITER(S): 9 INJECTION INTRAMUSCULAR; INTRAVENOUS; SUBCUTANEOUS at 21:00

## 2023-03-24 NOTE — ED PROVIDER NOTE - CLINICAL SUMMARY MEDICAL DECISION MAKING FREE TEXT BOX
Patient refused any further medical care in ED, and signed out AMA, see the paper AMA papers since this AMA dispo was done during the [facility] EMR downtime.   Patient signed AMA papers, but did not wait for any aftercare instructions and walked out.     Patient remained stable in ED, improved well. Patient refused any further medical care in ED, refused admission, refused any further diagnostic studies or treatments. Discussed with patient in detail about clinical condition, and the need for further medical evaluation/treatments, patient verbalized understanding and still refused. Discussed with patient about the risks of leaving AMA, Patient verbalized understanding the information provided and still wanted to leave AMA. Patient is awake, alert, o x 3, coherent, and is capacitated to make decisions. Discussed with patient in detail about clinical condition, results of the diagnostic studies, was told to come back to ED if decide to continue with medical evaluation/treatments, and was discussed about  the need for close out patient follow up. Detail aftercare instructions and return precautions are verbally given to patient.   I had extensive discussion of risks and benefits of pursuing further medical evaluation and/or care with patient and any available family/friends; patient still electing to leave against medical advice. Patient is awake, alert, oriented and demonstrates full capacity and insight into illness. Patient aware and encouraged to return immediately to ED or nearest ED if patient decides to change mind regarding care or if patient experiences any new, worsening, or concerning symptoms.

## 2023-03-24 NOTE — ED PROVIDER NOTE - ATTENDING APP SHARED VISIT CONTRIBUTION OF CARE
Patient is c/o fever, generalized body aches, nausea; no travel, denies URI symptoms.   Vitals reviewed.   Patient is tachycardic, will obtain repeat Temp.   Lungs: CTA, no wheezing, no crackles.  Abd: +BS, NT, ND, soft, no rebound, no guarding. No CVA tenderness, no rash.  CNS: awake, alert, o x 3, no focal neurologic deficits.  No meningeal signs noted,   A/P: Fever/nausea/generalized body aches,   Labs, IVF,   symptomatic treatment as needed,   reevaluation.

## 2023-03-24 NOTE — ED PROVIDER NOTE - NS ED ROS FT
Constitutional: no recent weight loss, change in appetite   Eyes: no redness/discharge/pain/vision changes  ENT: no rhinorrhea/ear pain/sore throat  Cardiac: No chest pain, SOB or edema.  Respiratory: No cough or respiratory distress  GI: No vomiting, diarrhea or abdominal pain.  : No dysuria, frequency, urgency or hematuria  MS: no pain to back or extremities, no loss of ROM  Neuro: No headache. No LOC.  Skin: No skin rash.  Endocrine: No history of thyroid disease or diabetes.  Except as documented in the HPI, all other systems are negative.

## 2023-03-24 NOTE — ED PROVIDER NOTE - OBJECTIVE STATEMENT
pt presents to ED c/o weakness, generalized malaise, nausea and intermittent fever for approx 1 week. Denies HA/dizziness/chest pain/palpitation/sob/abd pain/v/d/ black stool/bloody stool/urinary sxs

## 2023-03-24 NOTE — ED PROVIDER NOTE - NS ED ATTENDING STATEMENT MOD
This was a shared visit with the KENTRELL. I reviewed and verified the documentation and independently performed the documented:

## 2023-03-24 NOTE — ED ADULT TRIAGE NOTE - CHIEF COMPLAINT QUOTE
c/o generalized body aches, +joint pain, +nausea/diarrhea, denies abdominal pain, Hx OCD, anxiety, scoliosis

## 2023-03-25 LAB
APPEARANCE UR: CLEAR — SIGNIFICANT CHANGE UP
BILIRUB UR-MCNC: NEGATIVE — SIGNIFICANT CHANGE UP
COLOR SPEC: SIGNIFICANT CHANGE UP
D DIMER BLD IA.RAPID-MCNC: 173 NG/ML DDU — SIGNIFICANT CHANGE UP
DIFF PNL FLD: NEGATIVE — SIGNIFICANT CHANGE UP
GLUCOSE UR QL: ABNORMAL
HAV IGM SER-ACNC: SIGNIFICANT CHANGE UP
HBV CORE IGM SER-ACNC: SIGNIFICANT CHANGE UP
HBV SURFACE AB SER-ACNC: SIGNIFICANT CHANGE UP
HBV SURFACE AG SER-ACNC: SIGNIFICANT CHANGE UP
HBV SURFACE AG SER-ACNC: SIGNIFICANT CHANGE UP
HCV AB S/CO SERPL IA: 0.04 COI — SIGNIFICANT CHANGE UP
HCV AB S/CO SERPL IA: 0.1 S/CO — SIGNIFICANT CHANGE UP (ref 0–0.99)
HCV AB SERPL-IMP: SIGNIFICANT CHANGE UP
HCV AB SERPL-IMP: SIGNIFICANT CHANGE UP
KETONES UR-MCNC: NEGATIVE — SIGNIFICANT CHANGE UP
LEUKOCYTE ESTERASE UR-ACNC: NEGATIVE — SIGNIFICANT CHANGE UP
NITRITE UR-MCNC: NEGATIVE — SIGNIFICANT CHANGE UP
PH UR: 8.5 — HIGH (ref 5–8)
PROT UR-MCNC: SIGNIFICANT CHANGE UP
SP GR SPEC: 1.01 — SIGNIFICANT CHANGE UP (ref 1.01–1.03)
T PALLIDUM AB TITR SER: NEGATIVE — SIGNIFICANT CHANGE UP
UROBILINOGEN FLD QL: SIGNIFICANT CHANGE UP

## 2023-03-26 ENCOUNTER — EMERGENCY (EMERGENCY)
Facility: HOSPITAL | Age: 33
LOS: 0 days | Discharge: LEFT BEFORE DISCHARGE | End: 2023-03-26
Attending: EMERGENCY MEDICINE
Payer: COMMERCIAL

## 2023-03-26 VITALS
OXYGEN SATURATION: 99 % | SYSTOLIC BLOOD PRESSURE: 123 MMHG | HEART RATE: 100 BPM | TEMPERATURE: 98 F | DIASTOLIC BLOOD PRESSURE: 70 MMHG | RESPIRATION RATE: 18 BRPM

## 2023-03-26 DIAGNOSIS — Z53.29 PROCEDURE AND TREATMENT NOT CARRIED OUT BECAUSE OF PATIENT'S DECISION FOR OTHER REASONS: ICD-10-CM

## 2023-03-26 DIAGNOSIS — R78.81 BACTEREMIA: ICD-10-CM

## 2023-03-26 LAB
CULTURE RESULTS: SIGNIFICANT CHANGE UP
SPECIMEN SOURCE: SIGNIFICANT CHANGE UP

## 2023-03-26 PROCEDURE — 99284 EMERGENCY DEPT VISIT MOD MDM: CPT

## 2023-03-26 PROCEDURE — 99282 EMERGENCY DEPT VISIT SF MDM: CPT

## 2023-03-26 NOTE — ED PROVIDER NOTE - CLINICAL SUMMARY MEDICAL DECISION MAKING FREE TEXT BOX
Patient walked out soon after the copies of the results were printed and given to him and he did not wait for any AMA paper work. Patient stated that, his mother had  in this hospital and he gets anxious to come to our hospital, so wanted to leave ASA. Patient walked out.

## 2023-03-27 NOTE — ED POST DISCHARGE NOTE - RESULT SUMMARY
+ BLOOD- SPOKE WITH PATIENT, AGREES TO RETURN TO ED FOR RE-EVALUATION + BLOOD- SPOKE WITH PATIENT, AGREES TO RETURN TO ED FOR RE-EVALUATION. PRE-ARRIVAL NOTE.

## 2023-06-13 NOTE — ED PROVIDER NOTE - CCCP TRG CHIEF CMPLNT
Staged Advancement Flap Text: The defect edges were debeveled with a #15 scalpel blade.  Given the location of the defect, shape of the defect and the proximity to free margins a staged advancement flap was deemed most appropriate.  Using a sterile surgical marker, an appropriate advancement flap was drawn incorporating the defect and placing the expected incisions within the relaxed skin tension lines where possible. The area thus outlined was incised deep to adipose tissue with a #15 scalpel blade.  The skin margins were undermined to an appropriate distance in all directions utilizing iris scissors. generalized body aches/abdominal pain

## 2023-12-21 NOTE — ED ADULT NURSE NOTE - CHIEF COMPLAINT
From: Cyndee Mac  To: Kim Ortiz  Sent: 12/20/2023 12:00 PM CST  Subject: Rash on hand    I usually have eczema on my palms. The palms cleared up and a new rash appeared by my fingertips and have been spreading to each finger. I’ve woken up twice with my fingers feeling tight, bumps all over. It’s a heavy burning/itching sensation. Then the skin cracks open. I’m having a hard time grasping thing now  
Pt needs to be seen.  Has been well over 2 1/2 years since last seen.  Call in AM for appt  or may se Dr. Espinoza    
Pt was offered an appt today at 1:45 but pt refused due to work. Pt stated she wanted to see if there is an opening tomorrow. Pt is scheduled tomorrow at 9:45 but will let us know if it doesn't work. Pt verbalized understanding.  
The patient is a 31y Male complaining of vomiting.

## 2024-07-17 NOTE — ED PROVIDER NOTE - WET READ LAUNCH FT
CARDIOLOGY TRANSFER OF CARE NOTE        ORIGINAL REASON FOR CONSULT:  HOWARD (12/21/2018)  Eduarda Cobb MD   [address] [facility] [address]  Ishmael Roberts is a 82 year old male with the following issues: CARDIAC INVESTIGATIONS/IMAGING   Age 82  HTN/Dyslipidemia (highest  2/14/18)  CAD= (1/24/2019)= 80% mid posterolateral artery branch of the RCA left for medical management - BB stopped in 7/2020 admission due to SB (see note from 8/14/20)  Carotid Stenosis 70%-80% LICA stenosis (6/13/2021) s/p L carotid endarterectomy (02/16/2022) (Tresa)  Bradycardia/PAT/Mobitz 1/PVCs (4.5% on 11/9/21 Holter) (<0.01% 04/25/24 on amiodarone) and PACs (1.1%)= Dr. Mohsin Khan  Obesity There is no height or weight on file to calculate BMI.  JACOB; On BiPAP (Dr. Maria)  CKD 2  H/o CVA (2013) - details unclear, with TIA on 6/24/2020= diffuse intracranial disease with segmental occlusions of MCA etc= (CTA= 6/23/20) with another TIA (01/12/2022)  Left ectropion (eye) with several lid procedures  BPH: Biopsy negative (2019)  Small PFO (recent AC)      NT proBNP: 2920 (07/17/23)  10 year ASCVD risk: NA (h/o CVA)  [id number] Score: 6   H2FpEF Score: 5  NYHA FC: 2  Plaque Years: 2720 (LDL=34; 09/15/23)   TTE TELLO (08/03/23): LVEF 64%  LEXISCAN (07/25/2020): abnormal indicating a small area of moderate to severe ischemia in the mid to apical anterior wall.  ECG (02/27/24): Parox Tachy, PVC, Wenckebach block  CARDIAC CATH (01/24/2019): 80% Mid CONCEPCION lesion.  LVEDP 9 mmHg PVCs w/ ventricular escape complexes. Left axis deviation, septal infarct.  HOLTER MONITOR (04/25/24): predom SR w/ avg HR 62 bpm ( bpm), 0.79 % PAC, < 0.01 % PVC on amio.   Cardiac Event Monitor (07/03/2020): SR, avg HR 70 bpm. 18 auto trigger transmission w/ frequent PVCs and PACs. Some junctional escape beats noted.  Sleep Study (05/23/24 and 11/2014): Severe JACOB, CPAP provided.  Colonoscopy (10/30/19): Left-sided diverticulosis and external  hemorrhoids. No source of bleeding.  CTA Neck (01/12/2022): short length >70% stenosis of the proximal LICA (6/23/2020): 70% stenosis to the LICA and multifocal diffuse intracranial atherosclerotic disease (06/13/2021): stable disease from 2020.  Carotid US (09/23/24): <50% stenosis CHUCKY and LICA, mild-mod homogenous ather plaque at origin-prox. Seg of LICA s/p L CEA 2/26/22.     Mr. Roberts presented to the ED with nonspecific complaints of SOB and dizziness.  He was seen in the ED on 7/14/24 with complaints of SOB (like something being caught in his throat) - inhalers were prescribed and he was discharged home.  He again has similar complaints.  His HRs are also 40-50's with occasional PVCs.  He also endorses dizziness which is new.  Overtly non-specific complaints.  In the ED,  creat 1.2, hgb 11.4, , negative troponin.     Recently, Dr. Major who recommended amiodarone 200 for high PVC burden (23.94% Holter 3 day 7/16/23) and stopped metoprolol. After being prescribed amio, PVC remained at 21%. Dr. Major had changed amio to mexiletine , but it was discovered over the phone that pt was not taking amio since being prescribed, as well as clopidogrel, ezetimibe, or rosuvastatin and he did not recall when/why he stopped. He was advised to take all of these, and refills were requested. Dr. Major changed mexiletine back to amio and on follow up Holter PVC burden has reduced significantly to <0.01%.      ORIGINAL PRESENTATION     Patient was originally seen in consultation from PCP for worsening dyspnea. His dyspnea started about a year ago, but recently increased in frequency. He complained of dyspnea when walking fast and sometimes at rest. No associated chest pain, HA, dizziness, palpitations, or cough.    RIGHT HEART HEMODYNAMICS     C 1/24/2019:  BP= 138/81 (101) mmHg       HR= 69      AO sat= 94.4%  RA= 7 mmHg.  RV= 27/4 mmHg.  PAP= 37/16 (23) mmHg.                          PA sat= 64.7%  PCWP= 13 mmHg.  TPG= 10  mmHg  PVR 1.91 Kang  SVR 18.6 Kang  Thermodilution  Cardiac output (L/min)/cardiac index (L/min/m2)= 5.06/2.23.  Bereket  Cardiac output (L/min)/cardiac index (L/min/m2)= 4.54/2.00.    ECHO TREND     Date  LVEF  05/21/2014 62%  11/16/2017 55%  12/21/2018 56%  06/23/2020 64%  09/09/2021 63%  08/08/2023 64%    HOLTER / EVENT MONITOR TREND     DATE  PVC [place]  SVEA [place]  Avg HR  10/12/2017 30%   0%   69 ()  12/21/2018 4%   9%    72 ()  08/20/2020 22%   0%   65 ()  05/06/2021 19.5%  11/09/2021 4.5%   1%   75 ()  06/19/2023 23.94%      66 (39-95) Amio prescribed  01/16/2024 21%      71 () Amio was not being take (Pt reported)  04/25/2024 <0.01%      62 () Amio was being taken        CARDIAC MEDICATION CHANGES     1/9/2019: metoprolol tartrate 12.5 mg bid started after Holter  8/16/2019: metoprolol tartrate 12.5 mg bid switched to succinate 12.5 mg daily.  6/22/2020: ASA increased to 325 mg daily; metoprolol held temporarily during hospitalization.  07/23/2020: Started ASA 81 mg daily, Plavix  75 mg daily, and hydralazine 50 mg  TID. Lipitor increased to 80 mg daily (from 40 mg nightly). Metoprolol and aspirin 325 mg were discontinued.  8/28/20: hydralazine increased by PCP from 50 mg daily to bid  01/12/2021: hydralazine discontinued and lisinopril decreased to 20 mg daily (from 40 mg daily) and amlodipine to 5 mg daily (from 10 mg daily)  ----- restarted hydralazine 50 mg BID, increased amlodipine to 10 mg daily  07/01/22: Discontinue atorvastatin; start Crestor 40 mg daily for dyslipidemia  12/12/22: Discontinued ASA 81 mg for bleeding concerns  06/02/23: Increased lisinopril to 40 mg daily  07/13/23: Start amiodarone 200 mg for PVC burden, stop metoprolol succinate.  01/15/24: Pt reported only taking hydralazine once daily. Advised to take twice daily.  02/08/24: Pt reported not taking amiodarone, clopidogrel, ezetimibe or rosuvastatin dt no refills. Refills ordered.  02/27/24: Switch  amiodarone to mexiletine dt no change to PVC. Pt reported not taking amiodarone, so stopped mexiletine and reordered amiodarone 200 mg.        ACTIVITY LEVEL     Active with housework and yard work, does most of it himself.     REVIEW OF SYSTEMS     Negative other than what has been discussed/reported in the history.    LAST HOSPITALIZATION     07/23-07/25/2020: Presented to [facility] for syncope and collapse. Stroke protocol was initiated, CTOH negative for IC abnormalities, CTA head/neck showed severe L ICA stenosis (around 70%). Antiplatelet medications were adjusted(see below).Cardiology was consulted, telemetry revealed SB with 1st degree AV block (HR 47-76 bpm). BB was held. NM stress test was abnormal indicating a small area of moderate to severe ischemia in the mid to apical anterior wall. Cardiology recommended coronary angiogram. Patient became stressed and upset and refused additional testing and left AMA.    6/22/2020-6/24/2020: Intermittent LUE numbness/tingling admitted for TIA/stroke rule out. CT head and MRI unremarkable. CTA Neck revealed 70% stenosis to the left ICA with multifocal diffuse intracranial atherosclerotic disease.    PERSONAL/SOCIAL HISTORY     Lives alone in [place].   His wife recently passed away.   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  LTNS, no EtOH or drug use    FAMILY HISTORY     6 children. Daughters (twins) Karey and Marta have attended OVs.   6 siblings, no CAD  Mother had CAD     SURGICAL HISTORY     1/24/2019: Cardiac cath  10/30/2019: Colonoscopy    JACOB RISK ASSESSMENT     Diagnosed; On BiPAP    PAD/AAA RISK ASSESSMENT (ULTRASOUND/ABIs)     AAA: N/A (LTNS)    CURRENT MEDICATIONS     Current Facility-Administered Medications   Medication Dose Route Frequency Provider Last Rate Last Admin    sodium chloride 0.9 % flush bag 25 mL  25 mL Intravenous PRN Jessica Kim MD        sodium chloride 0.9 % injection 2 mL  2 mL Intracatheter 2 times per day Jessica Kim  MD EITAN         Current Outpatient Medications   Medication Sig Dispense Refill    amLODIPine (NORVASC) 10 MG tablet Take 1 tablet by mouth daily. 90 tablet 3    lisinopril (ZESTRIL) 40 MG tablet Take 1 tablet by mouth daily. 90 tablet 3    hydrALAZINE (APRESOLINE) 50 MG tablet Take 1 tablet by mouth in the morning and 1 tablet in the evening. 180 tablet 3    AMIODarone (PACERONE) 200 MG tablet Start amiodarone 200mg orally: 1 tablet twice daily for 14 days, then 1 tablet daily. (Patient taking differently: Take 200 mg by mouth daily.) 60 tablet 3    rosuvastatin (CRESTOR) 40 MG tablet Take 1 tablet by mouth daily. 90 tablet 3    ezetimibe (ZETIA) 10 MG tablet Take 1 tablet by mouth daily. 90 tablet 3    clopidogrel (Plavix) 75 MG tablet Take 1 tablet by mouth daily. 90 tablet 3    alfuzosin (UROXATRAL) 10 MG 24 hr tablet Take 1 tablet by mouth daily. 90 tablet 3    finasteride (PROSCAR) 5 MG tablet Take 1 tablet by mouth daily. 90 tablet 3    latanoprost (XALATAN) 0.005 % ophthalmic solution Instill 1 drop into both eyes at bedtime 7.5 mL 3    brimonidine (ALPHAGAN) 0.2 % ophthalmic solution Place 1 drop into both eyes in the morning and 1 drop in the evening. 15 mL 3    aspirin 81 MG chewable tablet Chew 81 mg by mouth daily.      COVID-19 mRNA, Pfizer, 30 MCG/0.3ML vaccine Inject 0.3 mLs into the muscle. 0.3 mL 0    zoster vaccine recomb adjuvanted (Shingrix) 50 MCG/0.5ML injection Repeat dose in 2 to 6 months (unless 1 dose already given), for a total of 2 doses. 1 each 1    amoxicillin (AMOXIL) 500 MG tablet Take 2,000 mg by mouth as needed (dental appts). Indications: take before dental procedures      albuterol 108 (90 Base) MCG/ACT inhaler Inhale 2 puffs into the lungs every 4 hours as needed for Shortness of Breath or Wheezing. Use any albuterol inhaler that is covered 1 Inhaler 5     Facility-Administered Medications Ordered in Other Encounters   Medication Dose Route Frequency Provider Last Rate Last Admin     sodium chloride 0.9 % flush bag 25 mL  25 mL Intravenous PRN David Kowalski DO        sodium chloride (PF) 0.9 % injection 2 mL  2 mL Intracatheter 2 times per day David Kowalski,         sodium chloride (NORMAL SALINE) 0.9 % bolus 500 mL  500 mL Intravenous PRN David Kowalski,           PHYSICAL EXAMINATION         7/17/2024     9:30 AM 7/17/2024    10:00 AM 7/17/2024    11:00 AM 7/17/2024    11:30 AM 7/17/2024    12:00 PM   [facility] Extended Vitals - Weight in Kg/Lb   /78 197/111 174/81 160/70 183/85   Pulse 63 49 67 69 48   Resp 20 19 20 20 19   Pulse Ox 99 % 97 % 97 % 98 % 96 %     General : No acute distress  HEENT: PERRL, Normocephalic/atraumatic, clear oropharynx  Neck: Supple, FROM, No LAP/Thyromegaly. Trachea central. No JVD (jugular vein distention) or carotid bruit.  CVS: S1, S2 normal. No M/R/G  Pulm: Clear to auscultation/percussion. No Wheeze/Rhonchi/Rales  Ext: No cyanosis/clubbing. Trace- +1 edema  Skin: No rash/palpable nodules    LABORATORY     Recent Labs   Lab 07/17/24  0752 07/17/24  0745 07/14/24  1259 07/14/24  1148 07/14/24  1146 04/25/24  1032 04/25/24  0956 01/16/24  1017 10/04/23  0903 09/15/23  1016   HGB  --  11.4*  --   --  12.4*  --  13.3  --    < >  --    PLT  --  172  --   --  179  --  211  --    < >  --    Sodium  --  140  --   --  141  --  143 140   < >  --    Potassium  --  4.0  --   --  3.6  --  4.3 3.8   < >  --    BUN  --  23*  --   --  16  --  20 14   < >  --    Creatinine 1.40* 1.28*  --  1.10 1.14  --  1.38* 1.16   < >  --    Glomerular Filtration Rate 51* 57*  --  68 65  --  52* 64   < >  --    Troponin I, High Sensitivity  --  16 16  --  13  --   --   --   --   --    NT-proBNP  --  292  --   --   --   --   --   --   --   --    TSH  --   --   --   --   --   --   --  1.797  --   --    Hemoglobin A1C  --   --   --   --   --   --   --   --   --  5.9*   Microalbumin/ Creatinine Ratio  --   --   --   --   --  9.8  --   --   --   --     < > = values in this  interval not displayed.     Recent Labs   Lab 09/15/23  1016   Cholesterol 107   HDL 64   Cholesterol/ HDL Ratio 1.7   CALCLDL 34   Triglycerides 43     ECHOCARDIOGRAM (08/03/23)     Final Impressions  * Normal left ventricular chamber size.  * Moderately increased left ventricular wall thickness.  * Normal left ventricular systolic function.  * Left ventricular ejection fraction, 64%.  * No left ventricular regional wall motion abnormalities.  * At least Grade I diastolic dysfunction.  * Normal right ventricular chamber size.  * Normal right ventricular systolic function.  * Moderately increased left atrial chamber size.  * Compared to the TTE performed on 9/9/2021, mild changes are noted.     ASSESSMENT AND PLAN     Ishmael Roberts is a 82 year old male with the following cardiovascular profile:    Age 82  Hypertension  Dyslipidemia  L Carotid Stenosis s/p endarterectomy (02/16/2022)   Paroxysmal tachycardia   Obesity  Obstructive sleep apnea  Chronic kidney disease  History of Cerebrovascular accident    Elderly man with obvious high 10-year MACE risk.  He has RCA / PDA disease that has been left for medical management  He now has had a conitnued complaint of SOB but not from the chest, rather the throat.    He is also more bradycardic today that he has been in the past (40's)  He had a high PVC burden previously and was referred to EP.  His most recent PVC burden is <1% no  NYHA FC 2    RECOMMENDATIONS     Hold amio for now  BNP stable, no volume overload  Consult to EP for bradycardia    Emily A Ollerman, CNP / Dr ANA LILIA Alfred MD   Cardiology  [phone]    There is no overt decompensation of his heart failure   No angina at this time  Very nonspecific complaints in general  Remains bradycardic though  Need EP for likely pacemaker  Patient has had several episodes of second-degree AV block on the previous event monitoring system    BRIT HENRIQUEZ MBBS ([facility]), MD, have personally taken a history, performed a  physical examination of the patient, reviewed the clinical data, labs, imaging, ecg.  I have reviewed and edited the above note as needed.  I have personally formulated the clinical impression and recommendations as stated.  I attest that I performed all of the medical decision-making for the \"substantive portion\" of this visit.     BRO Dodd, MD  [phone]         There are no Wet Read(s) to document. There is 1 Wet Read(s) to document.